# Patient Record
Sex: FEMALE | Race: OTHER | NOT HISPANIC OR LATINO | URBAN - METROPOLITAN AREA
[De-identification: names, ages, dates, MRNs, and addresses within clinical notes are randomized per-mention and may not be internally consistent; named-entity substitution may affect disease eponyms.]

---

## 2018-02-15 ENCOUNTER — EMERGENCY (EMERGENCY)
Facility: HOSPITAL | Age: 73
LOS: 0 days | Discharge: HOME | End: 2018-02-16
Attending: EMERGENCY MEDICINE

## 2018-02-15 DIAGNOSIS — I10 ESSENTIAL (PRIMARY) HYPERTENSION: ICD-10-CM

## 2018-02-15 DIAGNOSIS — R11.2 NAUSEA WITH VOMITING, UNSPECIFIED: ICD-10-CM

## 2018-02-15 DIAGNOSIS — R10.11 RIGHT UPPER QUADRANT PAIN: ICD-10-CM

## 2018-02-15 DIAGNOSIS — Z79.899 OTHER LONG TERM (CURRENT) DRUG THERAPY: ICD-10-CM

## 2018-02-15 DIAGNOSIS — E78.5 HYPERLIPIDEMIA, UNSPECIFIED: ICD-10-CM

## 2018-02-15 DIAGNOSIS — R10.13 EPIGASTRIC PAIN: ICD-10-CM

## 2018-02-16 VITALS
RESPIRATION RATE: 18 BRPM | TEMPERATURE: 96 F | HEART RATE: 76 BPM | SYSTOLIC BLOOD PRESSURE: 150 MMHG | DIASTOLIC BLOOD PRESSURE: 74 MMHG | OXYGEN SATURATION: 99 %

## 2018-02-16 VITALS
TEMPERATURE: 97 F | HEART RATE: 50 BPM | SYSTOLIC BLOOD PRESSURE: 159 MMHG | DIASTOLIC BLOOD PRESSURE: 73 MMHG | RESPIRATION RATE: 18 BRPM | OXYGEN SATURATION: 98 %

## 2018-02-16 DIAGNOSIS — K81.9 CHOLECYSTITIS, UNSPECIFIED: ICD-10-CM

## 2018-02-16 LAB
ANION GAP SERPL CALC-SCNC: 14 MMOL/L — SIGNIFICANT CHANGE UP (ref 7–14)
APTT BLD: 24.4 SEC — LOW (ref 27–39.2)
BASOPHILS # BLD AUTO: 0.03 K/UL — SIGNIFICANT CHANGE UP (ref 0–0.2)
BASOPHILS NFR BLD AUTO: 0.4 % — SIGNIFICANT CHANGE UP (ref 0–1)
BUN SERPL-MCNC: 12 MG/DL — SIGNIFICANT CHANGE UP (ref 10–20)
CALCIUM SERPL-MCNC: 8.9 MG/DL — SIGNIFICANT CHANGE UP (ref 8.5–10.1)
CHLORIDE SERPL-SCNC: 100 MMOL/L — SIGNIFICANT CHANGE UP (ref 98–110)
CK MB BLD-MCNC: 1 % — SIGNIFICANT CHANGE UP (ref 0–4)
CK MB CFR SERPL CALC: 1 NG/ML — SIGNIFICANT CHANGE UP (ref 0.6–6.3)
CK SERPL-CCNC: 91 U/L — SIGNIFICANT CHANGE UP (ref 0–225)
CO2 SERPL-SCNC: 24 MMOL/L — SIGNIFICANT CHANGE UP (ref 17–32)
CREAT SERPL-MCNC: 0.7 MG/DL — SIGNIFICANT CHANGE UP (ref 0.7–1.5)
EOSINOPHIL # BLD AUTO: 0.01 K/UL — SIGNIFICANT CHANGE UP (ref 0–0.7)
EOSINOPHIL NFR BLD AUTO: 0.1 % — SIGNIFICANT CHANGE UP (ref 0–8)
GLUCOSE SERPL-MCNC: 121 MG/DL — HIGH (ref 70–110)
HCT VFR BLD CALC: 36.1 % — LOW (ref 37–47)
HGB BLD-MCNC: 12 G/DL — LOW (ref 14–18)
IMM GRANULOCYTES NFR BLD AUTO: 0.3 % — SIGNIFICANT CHANGE UP (ref 0.1–0.3)
INR BLD: 1.08 RATIO — SIGNIFICANT CHANGE UP (ref 0.65–1.3)
LACTATE SERPL-SCNC: 1.7 MMOL/L — SIGNIFICANT CHANGE UP (ref 0.5–2.2)
LIDOCAIN IGE QN: 36 U/L — SIGNIFICANT CHANGE UP (ref 7–60)
LYMPHOCYTES # BLD AUTO: 1.73 K/UL — SIGNIFICANT CHANGE UP (ref 1.2–3.4)
LYMPHOCYTES # BLD AUTO: 22.1 % — SIGNIFICANT CHANGE UP (ref 20.5–51.1)
MAGNESIUM SERPL-MCNC: 1.7 MG/DL — LOW (ref 1.8–2.4)
MCHC RBC-ENTMCNC: 31.7 PG — HIGH (ref 27–31)
MCHC RBC-ENTMCNC: 33.2 G/DL — SIGNIFICANT CHANGE UP (ref 32–37)
MCV RBC AUTO: 95.3 FL — HIGH (ref 81–91)
MONOCYTES # BLD AUTO: 0.46 K/UL — SIGNIFICANT CHANGE UP (ref 0.1–0.6)
MONOCYTES NFR BLD AUTO: 5.9 % — SIGNIFICANT CHANGE UP (ref 1.7–9.3)
NEUTROPHILS # BLD AUTO: 5.57 K/UL — SIGNIFICANT CHANGE UP (ref 1.4–6.5)
NEUTROPHILS NFR BLD AUTO: 71.2 % — SIGNIFICANT CHANGE UP (ref 42.2–75.2)
PLATELET # BLD AUTO: 342 K/UL — SIGNIFICANT CHANGE UP (ref 130–400)
POTASSIUM SERPL-MCNC: 3.6 MMOL/L — SIGNIFICANT CHANGE UP (ref 3.5–5)
POTASSIUM SERPL-SCNC: 3.6 MMOL/L — SIGNIFICANT CHANGE UP (ref 3.5–5)
PROTHROM AB SERPL-ACNC: 11.7 SEC — SIGNIFICANT CHANGE UP (ref 9.95–12.87)
RBC # BLD: 3.79 M/UL — LOW (ref 4.2–5.4)
RBC # FLD: 13.5 % — SIGNIFICANT CHANGE UP (ref 11.5–14.5)
SODIUM SERPL-SCNC: 138 MMOL/L — SIGNIFICANT CHANGE UP (ref 135–146)
TROPONIN I SERPL-MCNC: <0.02 NG/ML — SIGNIFICANT CHANGE UP (ref 0–0.05)
WBC # BLD: 7.82 K/UL — SIGNIFICANT CHANGE UP (ref 4.8–10.8)
WBC # FLD AUTO: 7.82 K/UL — SIGNIFICANT CHANGE UP (ref 4.8–10.8)

## 2018-02-16 RX ORDER — SODIUM CHLORIDE 9 MG/ML
2000 INJECTION INTRAMUSCULAR; INTRAVENOUS; SUBCUTANEOUS ONCE
Qty: 0 | Refills: 0 | Status: COMPLETED | OUTPATIENT
Start: 2018-02-16 | End: 2018-02-16

## 2018-02-16 RX ORDER — MORPHINE SULFATE 50 MG/1
4 CAPSULE, EXTENDED RELEASE ORAL ONCE
Qty: 0 | Refills: 0 | Status: DISCONTINUED | OUTPATIENT
Start: 2018-02-16 | End: 2018-02-16

## 2018-02-16 RX ORDER — MOXIFLOXACIN HYDROCHLORIDE TABLETS, 400 MG 400 MG/1
1 TABLET, FILM COATED ORAL
Qty: 14 | Refills: 0 | OUTPATIENT
Start: 2018-02-16 | End: 2018-02-22

## 2018-02-16 RX ORDER — METRONIDAZOLE 500 MG
1 TABLET ORAL
Qty: 21 | Refills: 0 | OUTPATIENT
Start: 2018-02-16 | End: 2018-02-22

## 2018-02-16 RX ORDER — CIPROFLOXACIN LACTATE 400MG/40ML
400 VIAL (ML) INTRAVENOUS ONCE
Qty: 0 | Refills: 0 | Status: COMPLETED | OUTPATIENT
Start: 2018-02-16 | End: 2018-02-16

## 2018-02-16 RX ORDER — FAMOTIDINE 10 MG/ML
20 INJECTION INTRAVENOUS ONCE
Qty: 0 | Refills: 0 | Status: COMPLETED | OUTPATIENT
Start: 2018-02-16 | End: 2018-02-16

## 2018-02-16 RX ORDER — METRONIDAZOLE 500 MG
500 TABLET ORAL ONCE
Qty: 0 | Refills: 0 | Status: COMPLETED | OUTPATIENT
Start: 2018-02-16 | End: 2018-02-16

## 2018-02-16 RX ORDER — SODIUM CHLORIDE 9 MG/ML
1000 INJECTION INTRAMUSCULAR; INTRAVENOUS; SUBCUTANEOUS
Qty: 0 | Refills: 0 | Status: DISCONTINUED | OUTPATIENT
Start: 2018-02-16 | End: 2018-02-16

## 2018-02-16 RX ORDER — ONDANSETRON 8 MG/1
4 TABLET, FILM COATED ORAL ONCE
Qty: 0 | Refills: 0 | Status: COMPLETED | OUTPATIENT
Start: 2018-02-16 | End: 2018-02-16

## 2018-02-16 RX ORDER — MAGNESIUM SULFATE 500 MG/ML
2 VIAL (ML) INJECTION ONCE
Qty: 0 | Refills: 0 | Status: COMPLETED | OUTPATIENT
Start: 2018-02-16 | End: 2018-02-16

## 2018-02-16 RX ADMIN — FAMOTIDINE 20 MILLIGRAM(S): 10 INJECTION INTRAVENOUS at 03:18

## 2018-02-16 RX ADMIN — Medication 100 MILLIGRAM(S): at 09:40

## 2018-02-16 RX ADMIN — SODIUM CHLORIDE 500 MILLILITER(S): 9 INJECTION INTRAMUSCULAR; INTRAVENOUS; SUBCUTANEOUS at 03:18

## 2018-02-16 RX ADMIN — SODIUM CHLORIDE 1000 MILLILITER(S): 9 INJECTION INTRAMUSCULAR; INTRAVENOUS; SUBCUTANEOUS at 07:34

## 2018-02-16 RX ADMIN — Medication 50 GRAM(S): at 10:45

## 2018-02-16 RX ADMIN — MORPHINE SULFATE 4 MILLIGRAM(S): 50 CAPSULE, EXTENDED RELEASE ORAL at 07:34

## 2018-02-16 RX ADMIN — ONDANSETRON 4 MILLIGRAM(S): 8 TABLET, FILM COATED ORAL at 03:18

## 2018-02-16 RX ADMIN — Medication 200 MILLIGRAM(S): at 07:34

## 2018-02-16 RX ADMIN — MORPHINE SULFATE 4 MILLIGRAM(S): 50 CAPSULE, EXTENDED RELEASE ORAL at 03:18

## 2018-02-16 NOTE — ED ADULT NURSE REASSESSMENT NOTE - NS ED NURSE REASSESS COMMENT FT1
Pt asleep on bed, easily arousable.  Appears comfortable at present. Breathing spontaneously. Pt verbalized "feeling a little better"  No nausea no vomiting rendered. IVF infusing.  Will reeval.

## 2018-02-16 NOTE — CONSULT NOTE ADULT - ASSESSMENT
72 year old female with epigastric, RUQ pain of sudden onset which had completely resolved after 4 hours in the ED, diagnosed with cholecystitis on imaging. 72 year old female with epigastric, RUQ pain of sudden onset which had completely resolved after 4 hours in the ED.    Patient seen with Dr Hoffmann.    Plan:  Candidate for elective cholecystectomy as outpatient  Can be discharged from ED  5 days of cipro flagyl

## 2018-02-16 NOTE — CONSULT NOTE ADULT - SUBJECTIVE AND OBJECTIVE BOX
SCOTTIE RAZA [id number]  72y Female    HPI:  72 year old female with sudden onset sever epigastric pain that started at 10 pm last night. She had a relatively light meal for dinner at 6pm of rice and beans. Pain was localised to epigastrium and right upper quadrant and described to be intense and sharp. There no was no radiation to the back. Patient had 5 episodes of vomiting since the pain started of just food without blood. She last opened her bowels on Wed and is currently constipated which is not unusual for her. She denied fevers or generalized weakness and she has never had similar pain in the past. She denied previous abdominal surgeries and describes herself as being very fit and healthy.    PAST MEDICAL & SURGICAL HISTORY:  Hypertension, unspecified type  b/l cataract surgery       MEDICATIONS  (STANDING):  metroNIDAZOLE  IVPB 500 milliGRAM(s) IV Intermittent Once  sodium chloride 0.9%. 1000 milliLiter(s) (1000 mL/Hr) IV Continuous <Continuous>    MEDICATIONS  (PRN):  ASA     Allergies    No Known Allergies    Intolerances        REVIEW OF SYSTEMS    [ ] A ten-point review of systems was otherwise negative except as noted.  [ ] Due to altered mental status/intubation, subjective information were not able to be obtained from the patient. History was obtained, to the extent possible, from review of the chart and collateral sources of information.      Vital Signs Last 24 Hrs  T(C): 37 (16 Feb 2018 06:19), Max: 37 (16 Feb 2018 06:19)  T(F): 98.6 (16 Feb 2018 06:19), Max: 98.6 (16 Feb 2018 06:19)  HR: 65 (16 Feb 2018 06:19) (65 - 147)  BP: 109/55 (16 Feb 2018 06:19) (109/55 - 150/74)  BP(mean): --  RR: 16 (16 Feb 2018 06:19) (16 - 30)  SpO2: 98% (16 Feb 2018 06:19) (97% - 99%)    PHYSICAL EXAM:  GENERAL: NAD, well-appearing  CHEST/LUNG: Clear to auscultation bilaterally  HEART: Regular rate and rhythm  ABDOMEN: Soft, Nontender, Nondistended; Bowel sounds present. Alba's negative. Benign abdominal examination.  EXTREMITIES:  No clubbing, cyanosis, or edema      LABS:  Labs:  CAPILLARY BLOOD GLUCOSE                              12.0   7.82  )-----------( 342      ( 16 Feb 2018 03:13 )             36.1       Auto Neutrophil %: 71.2 % (02-16-18 @ 03:13)  Auto Immature Granulocyte %: 0.3 % (02-16-18 @ 03:13)    02-16    138  |  100  |  12  ----------------------------<  121<H>  3.6   |  24  |  0.7      Calcium, Total Serum: 8.9 mg/dL (02-16-18 @ 03:13)  Magnesium, Serum: 1.7 mg/dL (02-16-18 @ 03:13)      LFTs:             7.0  | 0.6  | 28       ------------------[97      ( 16 Feb 2018 03:13 )  3.8  | <0.1 | 32          Lipase:36     Amylase:x         Lactate, Blood: 1.7 mmol/L (02-16-18 @ 03:13)      Coags:     11.70  ----< 1.08    ( 16 Feb 2018 03:13 )     24.4        CARDIAC MARKERS ( 16 Feb 2018 03:13 )  <0.02 ng/mL / x     / 91 U/L / x     / 1.0 ng/mL              RADIOLOGY & ADDITIONAL STUDIES:  CTAP: IMPRESSION:   1. Findings most consistent with cholecystitis as above.   2. Mild inflammation surrounding the pancreatic head indicative of   pancreatitis. Correlate with amylase/lipase levels.   3. Right adnexal cyst measuring up to 4.4 cm. Nonemergent pelvic ultrasound   is recommended for further evaluation.     US: GALLBLADDER/BILIARY TREE: Gallbladder sludge, no calculi. There is minimal   gallbladder wall thickening measuring up to 0.4 cm.. Negative sonographic   Alba's sign. No intrahepatic biliary ductal dilatation. The common bile   duct measures 5 mm, which is normal.

## 2018-02-16 NOTE — CONSULT NOTE ADULT - ATTENDING COMMENTS
acute cholecystitis with no evidence of biliary obstruction   will d/c home on Antbx   f/u in office

## 2018-02-16 NOTE — ED PROVIDER NOTE - MEDICAL DECISION MAKING DETAILS
Patient with abd pain and vomiting, labs/US/CT results noted, seen by surgery, recommended abx and outpatient follow up. Patient remained stable, tolerated PO and want to go home to f/u as outpatient.   PT is asymptomatic at the time of d/c.   see the Chart for remaining details.

## 2018-02-16 NOTE — ED PROVIDER NOTE - NS ED ROS FT
Gen: No fevers, chills  Eyes:  No visual changes, eye pain or discharge.  ENMT:  No hearing changes, pain, No neck pain or stiffness.  Cardiac:  No chest pain, SOB  Respiratory:  No cough or respiratory distress.   GI:  see HPI  :  No dysuria, frequency or burning.  MS:  No myalgia, muscle weakness, joint pain or back pain.  Neuro:  No headache or weakness.  No LOC.  Skin:  No skin rash.   Endocrine: No history of thyroid disease or diabetes.

## 2018-02-16 NOTE — ED PROVIDER NOTE - OBJECTIVE STATEMENT
73 yo f w hx of HTN, HLD, on asa 81 p/w epigastric pain assoc with nbnb n/v that began last night approx 10 pm.  no fevers, chills, chest pain or sob.  No cough or congestion.  No sick contacts.  No recent travel.  No diarrhea, LBM yesterday normal.

## 2018-02-16 NOTE — ED PROVIDER NOTE - PROGRESS NOTE DETAILS
Pt notes that she is feeling much better at this time. Discussed pt status with Surgery resident, who will assess pt at bedside. patient is seen by surgery attending, recommended oral abx and to follow up with him as outpatietnt patient is seen by surgery attending, recommended oral abx and to follow up with him as outpatietnt  Patient denies any pain at this time, denies any symptoms, tolerated PO and want to go home.   Dr. Hoffmann, surgery attending evaluated patient and recommended abx and outpatient follow up.   Discussed with patient in detail about the need for abx and to return to ED for any abdominal pain, for any nausea/vomiting, for any fever/chills or any other symptoms, she verbalized understanding and agreed. patient is seen by surgery attending Dr. Hoffmann in ED, recommended oral abx and to follow up with him as outpatietnt  Patient denies any pain at this time, denies any symptoms, tolerated PO and want to go home.   Repeat HR is 68 BPM. Patient is no longer Tachycaric, no fever, no hypotension, no elevated WBCs, LFTs are in acceptable range.   Dr. Hoffmann, surgery attending evaluated patient and recommended abx and outpatient follow up.   Discussed with patient in detail about the need for abx and to return to ED for any abdominal pain, for any nausea/vomiting, for any fever/chills or any other symptoms, she verbalized understanding and agreed.

## 2018-02-16 NOTE — ED PROVIDER NOTE - PHYSICAL EXAMINATION
CONSTITUTIONAL: Well-developed; elderly female, appears uncomfortable.    SKIN: warm, dry  HEAD: Normocephalic; atraumatic.  EYES: no conj injection  ENT: No nasal discharge; airway clear.  NECK: Supple; non tender.  CARD: S1, S2 normal; no murmurs, gallops, or rubs. Rapid, regular.    RESP: No wheezes, rales or rhonchi.  ABD: soft, mild tenderness to RUQ, no rigidity or rebound.    EXT: Normal ROM.  No clubbing, cyanosis or edema.   NEURO: Alert, oriented, grossly unremarkable  PSYCH: Cooperative, appropriate.

## 2018-02-16 NOTE — ED PROVIDER NOTE - ATTENDING CONTRIBUTION TO CARE
Patient states that was doing well until tonight, around 10 pm suddenly started feeling nauseous, followed by vomited x 3, associated with upper abd pain, symptoms continued, came to ED for evaluation. Patient denies any cp/sob/fever/chills/trauma, denies any rash, denies any back pain.   Patient denies any other symptoms  Vitals noted  Patient is awake, alert, appears comfortable, not in distress  lungs: CTA  abd: +BS, epigastric tenderness, ND, soft  a/P: Abdominal pain/vomiting  labs, IVF, CT, reevaluation

## 2018-02-16 NOTE — CONSULT NOTE ADULT - PROBLEM SELECTOR RECOMMENDATION 9
Candidate for elective cholecystectomy as outpatient  Can be discharged from ED  5 days of cipro flagyl

## 2018-02-27 PROBLEM — Z00.00 ENCOUNTER FOR PREVENTIVE HEALTH EXAMINATION: Status: ACTIVE | Noted: 2018-02-27

## 2018-06-21 ENCOUNTER — APPOINTMENT (OUTPATIENT)
Dept: GERIATRICS | Facility: CLINIC | Age: 73
End: 2018-06-21

## 2018-10-01 NOTE — ED PEDIATRIC TRIAGE NOTE - RESPIRATORY RATE (BREATHS/MIN)
Last 5 Patient Entered Readings                                      Current 30 Day Average: 128/72     Recent Readings 9/30/2018 9/30/2018 9/29/2018 9/28/2018 9/28/2018    SBP (mmHg) 132 140 132 110 152    DBP (mmHg) 72 79 77 70 87    Pulse 59 54 57 62 56          Digital Medicine: Health  Follow Up    Left voicemail to follow up with Mrs. Chelsea Ford Child.  Current BP average 128/72 mmHg is at goal, [130/80]. BP is controlled.            30